# Patient Record
Sex: MALE | Race: WHITE | NOT HISPANIC OR LATINO | Employment: FULL TIME | URBAN - METROPOLITAN AREA
[De-identification: names, ages, dates, MRNs, and addresses within clinical notes are randomized per-mention and may not be internally consistent; named-entity substitution may affect disease eponyms.]

---

## 2024-08-31 ENCOUNTER — HOSPITAL ENCOUNTER (EMERGENCY)
Facility: CLINIC | Age: 24
Discharge: HOME | End: 2024-09-01
Attending: EMERGENCY MEDICINE | Admitting: EMERGENCY MEDICINE

## 2024-08-31 DIAGNOSIS — R07.9 CHEST PAIN, UNSPECIFIED TYPE: ICD-10-CM

## 2024-08-31 PROCEDURE — 99285 EMERGENCY DEPT VISIT HI MDM: CPT | Mod: 25

## 2024-08-31 PROCEDURE — 93005 ELECTROCARDIOGRAM TRACING: CPT

## 2024-08-31 ASSESSMENT — COLUMBIA-SUICIDE SEVERITY RATING SCALE - C-SSRS
6. HAVE YOU EVER DONE ANYTHING, STARTED TO DO ANYTHING, OR PREPARED TO DO ANYTHING TO END YOUR LIFE?: NO
2. HAVE YOU ACTUALLY HAD ANY THOUGHTS OF KILLING YOURSELF IN THE PAST MONTH?: NO
1. IN THE PAST MONTH, HAVE YOU WISHED YOU WERE DEAD OR WISHED YOU COULD GO TO SLEEP AND NOT WAKE UP?: NO

## 2024-09-01 ENCOUNTER — APPOINTMENT (OUTPATIENT)
Dept: GENERAL RADIOLOGY | Facility: CLINIC | Age: 24
End: 2024-09-01
Attending: EMERGENCY MEDICINE

## 2024-09-01 VITALS
BODY MASS INDEX: 24.43 KG/M2 | HEIGHT: 70 IN | RESPIRATION RATE: 20 BRPM | OXYGEN SATURATION: 99 % | TEMPERATURE: 98.4 F | WEIGHT: 170.64 LBS | SYSTOLIC BLOOD PRESSURE: 132 MMHG | HEART RATE: 68 BPM | DIASTOLIC BLOOD PRESSURE: 78 MMHG

## 2024-09-01 LAB
ALBUMIN SERPL BCG-MCNC: 4.2 G/DL (ref 3.5–5.2)
ALP SERPL-CCNC: 70 U/L (ref 40–150)
ALT SERPL W P-5'-P-CCNC: 25 U/L (ref 0–70)
ANION GAP SERPL CALCULATED.3IONS-SCNC: 12 MMOL/L (ref 7–15)
AST SERPL W P-5'-P-CCNC: 14 U/L (ref 0–45)
BASOPHILS # BLD AUTO: 0 10E3/UL (ref 0–0.2)
BASOPHILS NFR BLD AUTO: 1 %
BILIRUB SERPL-MCNC: 0.2 MG/DL
BUN SERPL-MCNC: 12.9 MG/DL (ref 6–20)
CALCIUM SERPL-MCNC: 9 MG/DL (ref 8.8–10.4)
CHLORIDE SERPL-SCNC: 101 MMOL/L (ref 98–107)
CREAT SERPL-MCNC: 0.89 MG/DL (ref 0.67–1.17)
EGFRCR SERPLBLD CKD-EPI 2021: >90 ML/MIN/1.73M2
EOSINOPHIL # BLD AUTO: 0.2 10E3/UL (ref 0–0.7)
EOSINOPHIL NFR BLD AUTO: 4 %
ERYTHROCYTE [DISTWIDTH] IN BLOOD BY AUTOMATED COUNT: 11.6 % (ref 10–15)
GLUCOSE SERPL-MCNC: 111 MG/DL (ref 70–99)
HCO3 SERPL-SCNC: 23 MMOL/L (ref 22–29)
HCT VFR BLD AUTO: 45.3 % (ref 40–53)
HGB BLD-MCNC: 15 G/DL (ref 13.3–17.7)
IMM GRANULOCYTES # BLD: 0 10E3/UL
IMM GRANULOCYTES NFR BLD: 0 %
LIPASE SERPL-CCNC: 30 U/L (ref 13–60)
LYMPHOCYTES # BLD AUTO: 2.1 10E3/UL (ref 0.8–5.3)
LYMPHOCYTES NFR BLD AUTO: 41 %
MCH RBC QN AUTO: 28.1 PG (ref 26.5–33)
MCHC RBC AUTO-ENTMCNC: 33.1 G/DL (ref 31.5–36.5)
MCV RBC AUTO: 85 FL (ref 78–100)
MONOCYTES # BLD AUTO: 0.6 10E3/UL (ref 0–1.3)
MONOCYTES NFR BLD AUTO: 12 %
NEUTROPHILS # BLD AUTO: 2.1 10E3/UL (ref 1.6–8.3)
NEUTROPHILS NFR BLD AUTO: 42 %
NRBC # BLD AUTO: 0 10E3/UL
NRBC BLD AUTO-RTO: 0 /100
PLATELET # BLD AUTO: 185 10E3/UL (ref 150–450)
POTASSIUM SERPL-SCNC: 3.8 MMOL/L (ref 3.4–5.3)
PROT SERPL-MCNC: 7 G/DL (ref 6.4–8.3)
RBC # BLD AUTO: 5.34 10E6/UL (ref 4.4–5.9)
SODIUM SERPL-SCNC: 136 MMOL/L (ref 135–145)
TROPONIN T SERPL HS-MCNC: <6 NG/L
WBC # BLD AUTO: 5 10E3/UL (ref 4–11)

## 2024-09-01 PROCEDURE — 85025 COMPLETE CBC W/AUTO DIFF WBC: CPT | Performed by: EMERGENCY MEDICINE

## 2024-09-01 PROCEDURE — 80053 COMPREHEN METABOLIC PANEL: CPT | Performed by: EMERGENCY MEDICINE

## 2024-09-01 PROCEDURE — 36415 COLL VENOUS BLD VENIPUNCTURE: CPT | Performed by: EMERGENCY MEDICINE

## 2024-09-01 PROCEDURE — 250N000013 HC RX MED GY IP 250 OP 250 PS 637: Performed by: EMERGENCY MEDICINE

## 2024-09-01 PROCEDURE — 84484 ASSAY OF TROPONIN QUANT: CPT | Performed by: EMERGENCY MEDICINE

## 2024-09-01 PROCEDURE — 83690 ASSAY OF LIPASE: CPT | Performed by: EMERGENCY MEDICINE

## 2024-09-01 PROCEDURE — 71046 X-RAY EXAM CHEST 2 VIEWS: CPT

## 2024-09-01 RX ORDER — MAGNESIUM HYDROXIDE/ALUMINUM HYDROXICE/SIMETHICONE 120; 1200; 1200 MG/30ML; MG/30ML; MG/30ML
15 SUSPENSION ORAL ONCE
Status: COMPLETED | OUTPATIENT
Start: 2024-09-01 | End: 2024-09-01

## 2024-09-01 RX ADMIN — ALUMINUM HYDROXIDE, MAGNESIUM HYDROXIDE, AND SIMETHICONE 15 ML: 1200; 120; 1200 SUSPENSION ORAL at 00:20

## 2024-09-01 NOTE — ED PROVIDER NOTES
"  Emergency Department Note      History of Present Illness     Chief Complaint   Chest Pain      HPI   Gagandeep Otero is a 23 year old male who presents to the emergency department with a complaint of chest pain.  Patient reports that he has had chest pain since this morning.  He has not had any shortness of breath, nausea, vomiting.  He reports that he has had this chest pain prior, but never has seen a doctor or been worked up.  He reports sometimes he gets nosebleeds when he has the chest pain, but no nosebleeds today.  Patient denies any fevers, cough.    Independent Historian   None    Review of External Notes       Past Medical History     Medical History and Problem List   No past medical history on file.    Medications   No current outpatient medications on file.      Surgical History   No past surgical history on file.    Physical Exam     Patient Vitals for the past 24 hrs:   BP Temp Temp src Pulse Resp SpO2 Height Weight   09/01/24 0059 132/78 -- -- 68 20 99 % -- --   08/31/24 2336 (!) 149/87 98.4  F (36.9  C) Temporal 70 16 99 % 1.778 m (5' 10\") 77.4 kg (170 lb 10.2 oz)     Physical Exam  General: Well-nourished, resting comfortably when I enter the room  Eyes: Pupils equal, conjunctivae pink no scleral icterus or conjunctival injection  ENT:  Moist mucus membranes  Respiratory:  Lungs clear to auscultation bilaterally, no crackles/rubs/wheezes.  Good air movement  CV: Normal rate and rhythm, no murmurs  GI:  Abdomen soft and non-distended.  No tenderness, guarding or rebound  Skin: Warm, dry.  No rashes or petechiae  Musculoskeletal: No peripheral edema or calf tenderness  Neuro: Alert and oriented to person/place/time  Psychiatric: Normal affect      Diagnostics     Lab Results   Labs Ordered and Resulted from Time of ED Arrival to Time of ED Departure   COMPREHENSIVE METABOLIC PANEL - Abnormal       Result Value    Sodium 136      Potassium 3.8      Carbon Dioxide (CO2) 23      Anion Gap 12      Urea " Nitrogen 12.9      Creatinine 0.89      GFR Estimate >90      Calcium 9.0      Chloride 101      Glucose 111 (*)     Alkaline Phosphatase 70      AST 14      ALT 25      Protein Total 7.0      Albumin 4.2      Bilirubin Total 0.2     LIPASE - Normal    Lipase 30     TROPONIN T, HIGH SENSITIVITY - Normal    Troponin T, High Sensitivity <6     CBC WITH PLATELETS AND DIFFERENTIAL    WBC Count 5.0      RBC Count 5.34      Hemoglobin 15.0      Hematocrit 45.3      MCV 85      MCH 28.1      MCHC 33.1      RDW 11.6      Platelet Count 185      % Neutrophils 42      % Lymphocytes 41      % Monocytes 12      % Eosinophils 4      % Basophils 1      % Immature Granulocytes 0      NRBCs per 100 WBC 0      Absolute Neutrophils 2.1      Absolute Lymphocytes 2.1      Absolute Monocytes 0.6      Absolute Eosinophils 0.2      Absolute Basophils 0.0      Absolute Immature Granulocytes 0.0      Absolute NRBCs 0.0         Imaging   Chest XR,  PA & LAT   Final Result   IMPRESSION: PA and lateral views of the chest were obtained. Cardiomediastinal silhouette is within normal limits. No suspicious focal pulmonary opacities. No significant pleural effusion or pneumothorax.               EKG     ECG results from 08/31/24   EKG 12 lead     Value    Systolic Blood Pressure     Diastolic Blood Pressure     Ventricular Rate 73    Atrial Rate 73    OH Interval 138    QRS Duration 90        QTc 398    P Axis 74    R AXIS 65    T Axis 35    Interpretation ECG      Sinus rhythm  Normal ECG  No previous ECGs available           Independent Interpretation   Chest x-ray does not show any sign of consolidation, pleural effusion, or pneumothorax.    ED Course      Medications Administered   Medications   alum & mag hydroxide-simethicone (MAALOX) suspension 15 mL (15 mLs Oral $Given 9/1/24 0020)       Procedures   Procedures     Discussion of Management   None    ED Course        Additional Documentation  None    Medical Decision Making / Diagnosis      CMS Diagnoses: None    MIPS       None    MDM   Gagandeep Otero is a 23 year old male presents emergency department with a complaint of chest pain.  Patient reports this has been going on intermittently for several months.  Today his pain started this morning and has not gone away.  No associated symptoms.  On exam patient is well-appearing.  Vital signs are all within acceptable limits.  Lung sounds are clear bilaterally.  Patient is PERC negative, and I have low suspicion for PE.  X-ray does not show any sign of pneumonia, pleural effusion, pneumothorax.  Troponin is negative.  No GLORY.  No leukocytosis.  EKG does not show any signs of ischemia.  I have low suspicion for ACS.  Lipase does not show any sign of pancreatitis.  Patient is given a GI cocktail, and has some improvement.  He will follow-up with his primary care physician.  Patient is discharged home.    Disposition   The patient was discharged.     Diagnosis     ICD-10-CM    1. Chest pain, unspecified type  R07.9 Primary Care Referral           Discharge Medications   There are no discharge medications for this patient.        MD Chapincito Dawson Elizabeth, MD  09/01/24 0256

## 2024-09-01 NOTE — ED TRIAGE NOTES
Intermit midsternal chest pain, pt reports he gets nose bleeds every time he gets CP. Has never been worked up for it.

## 2024-09-02 LAB
ATRIAL RATE - MUSE: 73 BPM
DIASTOLIC BLOOD PRESSURE - MUSE: NORMAL MMHG
INTERPRETATION ECG - MUSE: NORMAL
P AXIS - MUSE: 74 DEGREES
PR INTERVAL - MUSE: 138 MS
QRS DURATION - MUSE: 90 MS
QT - MUSE: 362 MS
QTC - MUSE: 398 MS
R AXIS - MUSE: 65 DEGREES
SYSTOLIC BLOOD PRESSURE - MUSE: NORMAL MMHG
T AXIS - MUSE: 35 DEGREES
VENTRICULAR RATE- MUSE: 73 BPM

## 2024-10-06 ENCOUNTER — HOSPITAL ENCOUNTER (EMERGENCY)
Facility: CLINIC | Age: 24
Discharge: HOME | End: 2024-10-06
Attending: PHYSICIAN ASSISTANT | Admitting: PHYSICIAN ASSISTANT

## 2024-10-06 ENCOUNTER — NURSE TRIAGE (OUTPATIENT)
Dept: NURSING | Facility: CLINIC | Age: 24
End: 2024-10-06

## 2024-10-06 VITALS
WEIGHT: 171.96 LBS | HEART RATE: 77 BPM | BODY MASS INDEX: 24.62 KG/M2 | OXYGEN SATURATION: 100 % | DIASTOLIC BLOOD PRESSURE: 81 MMHG | RESPIRATION RATE: 20 BRPM | TEMPERATURE: 98.1 F | SYSTOLIC BLOOD PRESSURE: 127 MMHG | HEIGHT: 70 IN

## 2024-10-06 DIAGNOSIS — S61.211A LACERATION OF LEFT INDEX FINGER WITHOUT FOREIGN BODY WITHOUT DAMAGE TO NAIL, INITIAL ENCOUNTER: ICD-10-CM

## 2024-10-06 PROCEDURE — 99282 EMERGENCY DEPT VISIT SF MDM: CPT

## 2024-10-06 RX ORDER — LIDOCAINE HYDROCHLORIDE 10 MG/ML
INJECTION, SOLUTION EPIDURAL; INFILTRATION; INTRACAUDAL; PERINEURAL
Status: DISCONTINUED
Start: 2024-10-06 | End: 2024-10-06 | Stop reason: HOSPADM

## 2024-10-06 ASSESSMENT — ACTIVITIES OF DAILY LIVING (ADL)
ADLS_ACUITY_SCORE: 35
ADLS_ACUITY_SCORE: 33

## 2024-10-06 NOTE — DISCHARGE INSTRUCTIONS
Clean wound daily with water and gentle soap.  Evaluate wound daily for pus drainage, spreading redness, streaking redness up the arm. Seek out reevaluation should these develop.  Keep wound covered while it heals.  Sutures need to be removed in 10-14 days.  Use Tylenol and ibuprofen for pain.    Discharge Instructions  Laceration (Cut)    You were seen today for a laceration (cut).  Your provider examined your laceration for any problems such a buried foreign body (like glass, a splinter, or gravel), or injury to blood vessels, tendons, and nerves.  Your provider may have also rinsed and/or scrubbed your laceration to help prevent an infection. It may not be possible to find all problems with your laceration on the first visit; occasionally foreign bodies or a tendon injury can go undetected.    Your laceration may have been closed in one of several ways:  No closure: many wounds will heal just fine without closure.  Stitches: regular stitches that require removal.  Staples: skin staples are often used in the scalp/head.  Wound adhesive (glue): skin glue can be used for certain lacerations and doesn t require removal.  Wound strips (aka Butterfly bandages or steri-strips): these are bandages that help to close a wound.  Absorbable stitches:  dissolving  stitches that go away on their own and usually don t require removal.    A small percentage of wounds will develop an infection regardless of how well the wound is cared for. Antibiotics are generally not indicated to prevent an infection so are only given for a small number of high-risk wounds. Some lacerations are too high risk to close, and are left open to heal because closure can increase the likelihood that an infection will develop.    Remember that all lacerations, no matter how expertly repaired, will cause scarring. We consider many factors, techniques, and materials, in our efforts to provide the best possible cosmetic outcome.    Generally, every  Emergency Department visit should have a follow-up clinic visit with either a primary or a specialty clinic/provider. Please follow-up as instructed by your emergency provider today.     Return to the Emergency Department right away if:  You have more redness, swelling, pain, drainage (pus), a bad smell, or red streaking from your laceration as these symptoms could indicate an infection.  You have a fever of 100.4 F or more.  You have bleeding that you cannot stop at home. If your cut starts to bleed, hold pressure on the bleeding area with a clean cloth or put pressure over the bandage.  If the bleeding does not stop after using constant pressure for 30 minutes, you should return to the Emergency Department for further treatment.  An area past the laceration is cool, pale, or blue compared with the other side, or has a slower return of color when squeezed.  Your dressing seems too tight or starts to get uncomfortable or painful. For children, signs of a problem might be irritability or restlessness.  You have loss of normal function or use of an area, such as being unable to straighten or bend a finger normally.  You have a numb area past the laceration.    Return to the Emergency Department or see your regular provider if:  The laceration starts to come open.   You have something coming out of the cut or a feeling that there is something in the laceration.  Your wound will not heal, or keeps breaking open. There can always be glass, wood, dirt or other things in any wound.  They will not always show up, even on x-rays.  If a wound does not heal, this may be why, and it is important to follow-up with your regular provider.    Home Care:  Take your dressing off in 12-24 hours, or as instructed by your provider, to check your laceration. Remove the dressing sooner if it seems too tight or painful, or if it is getting numb, tingly, or pale past the dressing.  Gently wash your laceration 1-2 times daily with clean  water and mild soap. It is okay to shower or run clean water over the laceration, but do not let the laceration soak in water (no swimming).  If your laceration was closed with wound adhesive or strips: pat it dry and leave it open to the air. For all other repairs: after you wash your laceration, or at least 2 times a day, apply antibiotic ointment (such as Neosporin  or Bacitracin ) to the laceration, then cover it with a Band-Aid  or gauze.  Keep the laceration clean. Wear gloves or other protective clothing if you are around dirt.    Follow-up for removal:  If your wound was closed with staples or regular stitches, they need to be removed according to the instructions and timeline specified by your provider today.  If your wound was closed with absorbable ( dissolving ) sutures, they should fall out, dissolve, or not be visible in about one week. If they are still visible, then they should be removed according to the instructions and timeline specified by your provider today.    Scars:  To help minimize scarring:  Wear sunscreen over the healed laceration when out in the sun.  Massage the area regularly once healed.  You may apply Vitamin E to the healed wound.  Wait. Scars improve in appearance over months and years.    If you were given a prescription for medicine here today, be sure to read all of the information (including the package insert) that comes with your prescription.  This will include important information about the medicine, its side effects, and any warnings that you need to know about.  The pharmacist who fills the prescription can provide more information and answer questions you may have about the medicine.  If you have questions or concerns that the pharmacist cannot address, please call or return to the Emergency Department.       Remember that you can always come back to the Emergency Department if you are not able to see your regular provider in the amount of time listed above, if you get  any new symptoms, or if there is anything that worries you.

## 2024-10-06 NOTE — ED TRIAGE NOTES
Pt cutting strawberries this am. Cut left hand finger- bleeding controlled. Up to date on tetanus       Triage Assessment (Adult)       Row Name 10/06/24 6249          Triage Assessment    Airway WDL WDL        Respiratory WDL    Respiratory WDL WDL        Skin Circulation/Temperature WDL    Skin Circulation/Temperature WDL X  finger lac        Peripheral/Neurovascular WDL    Peripheral Neurovascular WDL WDL

## 2024-10-06 NOTE — ED PROVIDER NOTES
"  Emergency Department Note      History of Present Illness     Chief Complaint   Finger Laceration      HPI   Gagandeep Otero is a 23 year old male who presents to the ED for evaluation of a finger laceration. Patient reports that around 0930 he was cutting strawberries when he sustained a laceration to his left pointer finger. He is right handed. He has had trouble with stopping bleeding, prompting presentation to the ED. No numbness or tingling. He is unsure of tetanus status.    Independent Historian   None    Review of External Notes   None    Past Medical History     Medical History and Problem List   No past medical history on file.    Medications   No current outpatient medications on file.      Surgical History   No past surgical history on file.    Physical Exam     Patient Vitals for the past 24 hrs:   BP Temp Pulse Resp SpO2 Height Weight   10/06/24 1651 127/81 98.1  F (36.7  C) 77 20 100 % 1.778 m (5' 10\") 78 kg (171 lb 15.3 oz)     Physical Exam  HEENT: mmm  Eyes: PERRL B/L  Neck: Supple  CV: Peripheral pulses intact and regular  Resp: Speaking in full sentences without any respiratory distress  Ext:  Left Hand  0.75cm laceration to lateral aspect of his distal phalanx of his pointer finger.  Full ROM of digits.  No bony TTP.  Sensation and perfusion intact throughout hand  Remainder of the skeletal survey is unremarkable  Skin: warm dry well perfused. See above.  Neuro: Alert  Nursing notes and vital signs reviewed.      Diagnostics     Lab Results   Labs Ordered and Resulted from Time of ED Arrival to Time of ED Departure - No data to display    Imaging   No orders to display     Independent Interpretation   None    ED Course      Medications Administered   Medications   Tdap (tetanus-diphtheria-acell pertussis) (ADACEL) injection 0.5 mL (has no administration in time range)   lidocaine (PF) (XYLOCAINE) 1 % injection (has no administration in time range)       Procedures   [facility] " [facility]    -Laceration Repair    Date/Time: 10/6/2024 6:03 PM    Performed by: Martín Goff PA-C  Authorized by: Martín Goff PA-C    Risks, benefits and alternatives discussed.      ANESTHESIA (see MAR for exact dosages):     Anesthesia method:  Nerve block    Block needle gauge:  27 G    Block anesthetic:  Lidocaine 1% w/o epi    Block technique:  Transthecal    Block injection procedure:  Anatomic landmarks identified, anatomic landmarks palpated, introduced needle, negative aspiration for blood and incremental injection    Block outcome:  Incomplete block    LACERATION DETAILS     Location:  Finger    Finger location:  L index finger    Length (cm):  0.8    REPAIR TYPE:     Repair type:  Simple    EXPLORATION:     Contaminated: no      TREATMENT:     Amount of cleaning:  Standard    Irrigation solution:  Tap water    Irrigation method:  Tap    Visualized foreign bodies/material removed: no      SKIN REPAIR     Repair method:  Sutures    Suture size:  4-0    Suture material:  Nylon    Suture technique:  Simple interrupted    Number of sutures:  3    APPROXIMATION     Approximation:  Close    POST-PROCEDURE DETAILS     Dressing:  Antibiotic ointment and adhesive bandage      PROCEDURE    Patient Tolerance:  Patient tolerated the procedure well with no immediate complications       Discussion of Management   None    ED Course        Additional Documentation  None    Medical Decision Making / Diagnosis     CMS Diagnoses: None    MIPS       None    MDM   Gagandeep Otero is a 23 year old male who presents for evaluation of a laceration to the left pointer finger.  The wound was carefully evaluated and explored.  The laceration was closed as noted above.  There is no evidence of muscular, tendon, or bony damage with this laceration.  No signs of foreign body.  Possible complications (infection, scarring) were reviewed with the patient.  Follow up with primary care as noted in the discharge section. Patient declines  tetanus update. Discussed reasons to return. All questions answered. Patient discharged to home in stable condition.    Disposition   The patient was discharged.     Diagnosis     ICD-10-CM    1. Laceration of left index finger without foreign body without damage to nail, initial encounter  S61.211A Primary Care Referral           Discharge Medications   New Prescriptions    No medications on file       This record was created at least in part using electronic voice recognition software, so please excuse any typographical errors.         Martín Goff PA-C  10/06/24 1800

## 2024-10-06 NOTE — TELEPHONE ENCOUNTER
Patient cut his finger on left index finger about 9:30 am.  Cut his finger with a knife. Finger is still bleeding.  Patient states that he will go to ER.    Reason for Disposition   [1] Bleeding AND [2] won't stop after 10 minutes of direct pressure (using correct technique)    Additional Information   Negative: [1] Major bleeding (e.g., actively dripping or spurting) AND [2] can't be stopped   Negative: Amputation   Negative: Shock suspected (e.g., cold/pale/clammy skin, too weak to stand, low BP, rapid pulse)   Negative: [1] Knife wound (or other possibly deep cut) AND [2] to chest, abdomen, back, neck, or head   Negative: [1] Self-injury (e.g., cutting, self-harm) AND [2] suicidal or out-of-control   Negative: Sounds like a life-threatening emergency to the triager   Negative: [1] Animal bite AND [2] broken skin   Negative: [1] Human bite AND [2] broken skin   Negative: Puncture wound    Protocols used: Cuts and [id number]

## 2024-12-28 ENCOUNTER — NURSE TRIAGE (OUTPATIENT)
Dept: URGENT CARE | Facility: URGENT CARE | Age: 24
End: 2024-12-28

## 2024-12-28 NOTE — TELEPHONE ENCOUNTER
Patient was seen in  yesterday and has been following recommendation of ibuprofen at home with no improvement. Patient is drinking lots of fluids, warm and cold. Throat pain 8/10. Patient gargling with warm salt water. Sore throat started Hernando Clark. The lymph node on the right side of his neck goes from his ear to almost his real's apple. No fever or redness.   Protocol recommends see HCP within 24 hours  Patient care advice given. Patient will return to  tomorrow.  Dari Holden RN   12/28/24 2:25 PM  [facility] Nurse Advisor                Reason for Disposition   [1] Sore throat is the only symptom AND [2] sore throat present < 48 hours   [1] Single large node AND [2] size > 1 inch (2.5 cm) AND [3] no fever   [1] Tender node in the neck AND [2] also has a sore throat AND [3] minimal/no runny nose or cough    Additional Information   Negative: SEVERE difficulty breathing (e.g., struggling for each breath, speaks in single words, stridor)   Negative: Sounds like a life-threatening emergency to the triager   Negative: [1] Diagnosed strep throat AND [2] taking antibiotic AND [3] symptoms continue   Negative: Throat culture results, call about   Negative: Productive cough is main symptom   Negative: Non-productive cough is main symptom   Negative: Hoarseness is main symptom   Negative: Runny nose is main symptom   Negative: Uvula swelling is main symptom   Negative: [1] Drooling or spitting out saliva (because can't swallow) AND [2] normal breathing   Negative: Unable to open mouth completely   Negative: [1] Difficulty breathing AND [2] not severe   Negative: Fever > 104 F (40 C)   Negative: [1] Refuses to drink anything AND [2] for > 12 hours   Negative: [1] Drinking very little AND [2] dehydration suspected (e.g., no urine > 12 hours, very dry mouth, very lightheaded)   Negative: Patient sounds very sick or weak to the triager   Negative: SEVERE (e.g., excruciating) throat pain   Negative: [1] Pus on  "tonsils (back of throat) AND [2]  fever AND [3] swollen neck lymph nodes (\"glands\")   Negative: [1] Rash AND [2] widespread (especially chest and abdomen)   Negative: Earache also present   Negative: Fever present > 3 days (72 hours)   Negative: Diabetes mellitus or weak immune system (e.g., HIV positive, cancer chemo, splenectomy, organ transplant, chronic steroids)   Negative: History of rheumatic fever   Negative: [1] Positive throat culture or rapid strep test (according to lab, PCP, caller, etc.) AND [2] NO  standing order to call in prescription for antibiotic   Negative: [1] Exposure to family member (or spouse or boyfriend/girlfriend) with test-proven strep AND [2] within last 10 days   Negative: [1] Adult is leaving on a trip AND [2] requests an antibiotic NOW   Negative: [1] Sore throat is the only symptom AND [2] present > 48 hours   Negative: [1] Sore throat with cough/cold symptoms AND [2] present > 5 days   Negative: SEVERE difficulty breathing (e.g., struggling for each breath, speaks in single words)   Negative: Known hernia is main concern (i.e., soft lump or swelling in the groin that goes away when you push on it)   Negative: Swelling of scrotum is main symptom   Negative: Swelling of face is main symptom   Negative: [1] Swollen node is in the neck AND [2] < 1 inch (2.5 cm) in size AND [3] sore throat is main symptom   Negative: [1] Node is in the neck AND [2] causes difficulty breathing   Negative: [1] Node is in the neck AND [2] can't swallow fluids   Negative: Fever > 103 F (39.4 C)   Negative: [1] Lump or swelling in groin AND [2] pulsating (like heartbeat)   Negative: Patient sounds very sick or weak to the triager   Negative: [1] Single large node AND [2] size > 1 inch (2.5 cm) AND [3] fever   Negative: [1] Overlying skin is red AND [2] fever    Protocols used: Sore Throat-A-, Lymph Nodes - [id number]    "

## 2024-12-28 NOTE — TELEPHONE ENCOUNTER
General Call      Reason for Call:  call back    What are your questions or concerns:  Patient was seen yesterday in urgent care and ibuprofen or cough spray is not helping and is wondering if he can get prescribed a medication to help with the symptoms     Date of last appointment with provider: 12/27/24    Okay to leave a detailed message?: Yes at Cell number on file:    Telephone Information:   Mobile [phone]

## 2024-12-29 ENCOUNTER — HOSPITAL ENCOUNTER (EMERGENCY)
Facility: CLINIC | Age: 24
Discharge: HOME | End: 2024-12-29
Attending: EMERGENCY MEDICINE | Admitting: EMERGENCY MEDICINE

## 2024-12-29 VITALS
SYSTOLIC BLOOD PRESSURE: 136 MMHG | OXYGEN SATURATION: 98 % | RESPIRATION RATE: 18 BRPM | BODY MASS INDEX: 25.2 KG/M2 | HEIGHT: 70 IN | HEART RATE: 102 BPM | DIASTOLIC BLOOD PRESSURE: 92 MMHG | TEMPERATURE: 98.4 F | WEIGHT: 176 LBS

## 2024-12-29 DIAGNOSIS — B27.90 INFECTIOUS MONONUCLEOSIS WITHOUT COMPLICATION, INFECTIOUS MONONUCLEOSIS DUE TO UNSPECIFIED ORGANISM: ICD-10-CM

## 2024-12-29 DIAGNOSIS — D69.6 THROMBOCYTOPENIA (H): ICD-10-CM

## 2024-12-29 LAB
BASOPHILS # BLD AUTO: 0.1 10E3/UL (ref 0–0.2)
BASOPHILS NFR BLD AUTO: 1 %
EOSINOPHIL # BLD AUTO: 0 10E3/UL (ref 0–0.7)
EOSINOPHIL NFR BLD AUTO: 0 %
ERYTHROCYTE [DISTWIDTH] IN BLOOD BY AUTOMATED COUNT: 12.8 % (ref 10–15)
HCT VFR BLD AUTO: 44.4 % (ref 40–53)
HGB BLD-MCNC: 14.9 G/DL (ref 13.3–17.7)
HOLD SPECIMEN: NORMAL
IMM GRANULOCYTES # BLD: 0 10E3/UL
IMM GRANULOCYTES NFR BLD: 0 %
LYMPHOCYTES # BLD AUTO: 7.3 10E3/UL (ref 0.8–5.3)
LYMPHOCYTES NFR BLD AUTO: 66 %
MCH RBC QN AUTO: 27.5 PG (ref 26.5–33)
MCHC RBC AUTO-ENTMCNC: 33.6 G/DL (ref 31.5–36.5)
MCV RBC AUTO: 82 FL (ref 78–100)
MONOCYTES # BLD AUTO: 0.6 10E3/UL (ref 0–1.3)
MONOCYTES NFR BLD AUTO: 6 %
MONOCYTES NFR BLD AUTO: POSITIVE %
NEUTROPHILS # BLD AUTO: 3 10E3/UL (ref 1.6–8.3)
NEUTROPHILS NFR BLD AUTO: 27 %
NRBC # BLD AUTO: 0 10E3/UL
NRBC BLD AUTO-RTO: 0 /100
PLAT MORPH BLD: NORMAL
PLATELET # BLD AUTO: 123 10E3/UL (ref 150–450)
RBC # BLD AUTO: 5.41 10E6/UL (ref 4.4–5.9)
RBC MORPH BLD: NORMAL
S PYO DNA THROAT QL NAA+PROBE: NOT DETECTED
WBC # BLD AUTO: 11 10E3/UL (ref 4–11)

## 2024-12-29 PROCEDURE — 99284 EMERGENCY DEPT VISIT MOD MDM: CPT | Mod: 25

## 2024-12-29 PROCEDURE — 96361 HYDRATE IV INFUSION ADD-ON: CPT

## 2024-12-29 PROCEDURE — 85004 AUTOMATED DIFF WBC COUNT: CPT | Performed by: EMERGENCY MEDICINE

## 2024-12-29 PROCEDURE — 96375 TX/PRO/DX INJ NEW DRUG ADDON: CPT

## 2024-12-29 PROCEDURE — 250N000011 HC RX IP 250 OP 636: Performed by: EMERGENCY MEDICINE

## 2024-12-29 PROCEDURE — 86308 HETEROPHILE ANTIBODY SCREEN: CPT | Performed by: EMERGENCY MEDICINE

## 2024-12-29 PROCEDURE — 258N000003 HC RX IP 258 OP 636: Performed by: EMERGENCY MEDICINE

## 2024-12-29 PROCEDURE — 36415 COLL VENOUS BLD VENIPUNCTURE: CPT | Performed by: EMERGENCY MEDICINE

## 2024-12-29 PROCEDURE — 87651 STREP A DNA AMP PROBE: CPT | Performed by: EMERGENCY MEDICINE

## 2024-12-29 PROCEDURE — 96374 THER/PROPH/DIAG INJ IV PUSH: CPT

## 2024-12-29 RX ORDER — DEXAMETHASONE SODIUM PHOSPHATE 10 MG/ML
10 INJECTION, SOLUTION INTRAMUSCULAR; INTRAVENOUS ONCE
Status: COMPLETED | OUTPATIENT
Start: 2024-12-29 | End: 2024-12-29

## 2024-12-29 RX ORDER — KETOROLAC TROMETHAMINE 15 MG/ML
15 INJECTION, SOLUTION INTRAMUSCULAR; INTRAVENOUS ONCE
Status: COMPLETED | OUTPATIENT
Start: 2024-12-29 | End: 2024-12-29

## 2024-12-29 RX ORDER — DEXAMETHASONE 4 MG/1
TABLET ORAL
Qty: 5 TABLET | Refills: 0 | Status: SHIPPED | OUTPATIENT
Start: 2024-12-29

## 2024-12-29 RX ADMIN — KETOROLAC TROMETHAMINE 15 MG: 15 INJECTION, SOLUTION INTRAMUSCULAR; INTRAVENOUS at 07:00

## 2024-12-29 RX ADMIN — SODIUM CHLORIDE 1000 ML: 9 INJECTION, SOLUTION INTRAVENOUS at 07:01

## 2024-12-29 RX ADMIN — DEXAMETHASONE SODIUM PHOSPHATE 10 MG: 10 INJECTION, SOLUTION INTRAMUSCULAR; INTRAVENOUS at 07:01

## 2024-12-29 ASSESSMENT — ACTIVITIES OF DAILY LIVING (ADL)
ADLS_ACUITY_SCORE: 41

## 2024-12-29 NOTE — ED TRIAGE NOTES
Pt presents with c/o sore throat since 12/25/24. Was seen at  and told strep test was negative and to use ibuprofen for pain.    Pain continue to worsen and pt has very swollen tonsils with difficulty swallowing due to pain. Tonsils appear swollen with exudate seen   Triage Assessment (Adult)       Row Name 12/29/24 0605          Triage Assessment    Airway WDL WDL        Respiratory WDL    Respiratory WDL WDL        Skin Circulation/Temperature WDL    Skin Circulation/Temperature WDL WDL        Cardiac WDL    Cardiac WDL X  rapid        Peripheral/Neurovascular WDL    Peripheral Neurovascular WDL WDL        Cognitive/Neuro/Behavioral WDL    Cognitive/Neuro/Behavioral WDL WDL

## 2024-12-29 NOTE — LETTER
December 29, 2024      To Whom It May Concern:      Gagandeep Otero was seen in our Emergency Department today, 12/29/24 and diagnosed with mononucleosis. I expect his condition to improve over the next 7 days.    Sincerely,        Kristy Olivera MD  Electronically signed

## 2024-12-29 NOTE — ED PROVIDER NOTES
"  History     Chief Complaint:  Pharyngitis       HPI   Gagandeep Otero is a 24 year old male who presents with pharyngitis. The patient reports having a sore throat since December 24th; he had going to the ER and was tested negative for strep. His pain was since worsened, feeling like he had \"glass\" in his throat. He has taken ibuprofen with not relief. He notes a low grade fever of 100F measured at home. He has been able to keep food down, stating increase of liquid intake. Patient denies abdominal pain, coughing or emesis. He does not have pertinent medica problems. He denies being exposed to mono or having mono in the past.     Review of External notes      Medications:    The patient is not currently taking any regular medications.        Past Medical History:    The patient denies any significant past medical history.       Physical Exam   Patient Vitals for the past 24 hrs:   BP Temp Temp src Pulse Resp SpO2 Height Weight   12/29/24 0603 136/82 98.4  F (36.9  C) Temporal 115 20 100 % 1.778 m (5' 10\") 79.8 kg (176 lb)        Physical Exam    Gen: alert, answers all questions appropriately.   HEENT: PERRL. Oropharyngeal erythema, Bilateral exudate. No trismus, uvula midline, oropharynx symmetric. Bilateral adenopathy. Voice slightly hoarse, is able to swallow.   Ears-  Right ear: TM normal, no tenderness over the external ear or mastoid  Left ear: TM normal, no tenderness over the external ear or mastoid  Neck: full AROM  CV: RRR  Pulm: breath sounds equal, lungs clear  Skin: facial skin normal  Neuro: CN 5 and 7 normal     Emergency Department Course   ECG:  ECG results from 08/31/24   EKG 12 lead     Value    Systolic Blood Pressure     Diastolic Blood Pressure     Ventricular Rate 73    Atrial Rate 73    OR Interval 138    QRS Duration 90        QTc 398    P Axis 74    R AXIS 65    T Axis 35    Interpretation ECG      Sinus rhythm  Normal ECG  No previous ECGs available  Confirmed by - EMERGENCY ROOM, " PHYSICIAN ([id number]),  GAYATHRI OBRIEN ([id number]) on 9/2/2024 9:07:29 AM         Imaging:  No orders to display          Laboratory:  Labs Ordered and Resulted from Time of ED Arrival to Time of ED Departure - No data to display       ED Course as of 12/29/24 1506   Sun Dec 29, 2024   0624 I obtained the history and examined the patient as noted above.     0925 I rechecked the patient and explained findings; patient is getting discharged.          Interventions:  Medications - No data to display     Impression & Plan    Independent Interpretation:      Medical Decision Making:  Patient presents for evaluation of sore throat.  Exam shows tonsillitis anterior cervical adenopathy.  Monospot positive.  History and exam do correlate with mononucleosis infection.  Mild thrombocytopenia noted.  Likely due to mono.  Patient feeling improved after steroids and IV fluids here.  Will give 2 additional doses of steroids for home dexamethasone.  Encouraged to see primary care within the next 1 week for reassessment.  Discussed return precautions including increasing pain, difficulty swallowing, shortness of breath or other worsening.  Discussed risk for splenic rupture with trauma and advised to avoid any contact activity.  Discharge home    Disposition:  Discharge    Diagnosis:    ICD-10-CM    1. Infectious mononucleosis without complication, infectious mononucleosis due to unspecified organism  B27.90       2. Thrombocytopenia (H)  D69.6            Discharge Medications:  Discharge Medication List as of 12/29/2024  9:37 AM        START taking these medications    Details   dexAMETHasone (DECADRON) 4 MG tablet Take 10 mg (2.5 tabs) Tuesday with breakfast and Thursday with breakfast, Disp-5 tablet, R-0, E-Prescribe              Kristy Olivera  December 29, 2024        Kristy Olivera MD  12/29/24 1502

## 2024-12-30 ENCOUNTER — NURSE TRIAGE (OUTPATIENT)
Dept: INTERNAL MEDICINE | Facility: CLINIC | Age: 24
End: 2024-12-30

## 2024-12-30 NOTE — TELEPHONE ENCOUNTER
Patient reports mono symptoms are not improving and having a difficult time sleeping overnight due to symptoms. Patient denies any SOB or difficulty breathing right now but overnight did feel tonsils were more swollen.     Discussed with 2 providers today to possible use [facility] spot as no appointments in clinic and patient does not have PCP. Both providers recommended take prescription for steroids today and have to allow body to fight infection.     Discussed red flag symptoms with patient, and scheduled follow-up for tomorrow morning. Also discussed importance of being scheduled for primary care/annual- patient will schedule tomorrow.     Summer RN 8:41 AM December 30, 2024   [facility]

## 2024-12-31 ENCOUNTER — APPOINTMENT (OUTPATIENT)
Dept: CT IMAGING | Facility: CLINIC | Age: 24
End: 2024-12-31
Attending: PHYSICIAN ASSISTANT

## 2024-12-31 ENCOUNTER — HOSPITAL ENCOUNTER (EMERGENCY)
Facility: CLINIC | Age: 24
Discharge: HOME | End: 2024-12-31
Attending: PHYSICIAN ASSISTANT

## 2024-12-31 VITALS
RESPIRATION RATE: 18 BRPM | SYSTOLIC BLOOD PRESSURE: 147 MMHG | OXYGEN SATURATION: 100 % | HEART RATE: 105 BPM | TEMPERATURE: 98.8 F | DIASTOLIC BLOOD PRESSURE: 84 MMHG

## 2024-12-31 DIAGNOSIS — B27.90 INFECTIOUS MONONUCLEOSIS WITHOUT COMPLICATION, INFECTIOUS MONONUCLEOSIS DUE TO UNSPECIFIED ORGANISM: ICD-10-CM

## 2024-12-31 DIAGNOSIS — J03.90 EXUDATIVE TONSILLITIS: ICD-10-CM

## 2024-12-31 LAB
ANION GAP SERPL CALCULATED.3IONS-SCNC: 13 MMOL/L (ref 7–15)
BUN SERPL-MCNC: 11.8 MG/DL (ref 6–20)
CALCIUM SERPL-MCNC: 8.9 MG/DL (ref 8.8–10.4)
CHLORIDE SERPL-SCNC: 100 MMOL/L (ref 98–107)
CREAT SERPL-MCNC: 0.95 MG/DL (ref 0.67–1.17)
EGFRCR SERPLBLD CKD-EPI 2021: >90 ML/MIN/1.73M2
GLUCOSE SERPL-MCNC: 102 MG/DL (ref 70–99)
HCO3 SERPL-SCNC: 23 MMOL/L (ref 22–29)
POTASSIUM SERPL-SCNC: 4.1 MMOL/L (ref 3.4–5.3)
SODIUM SERPL-SCNC: 136 MMOL/L (ref 135–145)

## 2024-12-31 PROCEDURE — 250N000009 HC RX 250: Performed by: PHYSICIAN ASSISTANT

## 2024-12-31 PROCEDURE — 96374 THER/PROPH/DIAG INJ IV PUSH: CPT | Mod: 59 | Performed by: PHYSICIAN ASSISTANT

## 2024-12-31 PROCEDURE — 250N000011 HC RX IP 250 OP 636: Performed by: PHYSICIAN ASSISTANT

## 2024-12-31 PROCEDURE — 85007 BL SMEAR W/DIFF WBC COUNT: CPT | Performed by: PHYSICIAN ASSISTANT

## 2024-12-31 PROCEDURE — 96361 HYDRATE IV INFUSION ADD-ON: CPT | Performed by: PHYSICIAN ASSISTANT

## 2024-12-31 PROCEDURE — 70491 CT SOFT TISSUE NECK W/DYE: CPT

## 2024-12-31 PROCEDURE — 36415 COLL VENOUS BLD VENIPUNCTURE: CPT | Performed by: PHYSICIAN ASSISTANT

## 2024-12-31 PROCEDURE — 85027 COMPLETE CBC AUTOMATED: CPT | Performed by: PHYSICIAN ASSISTANT

## 2024-12-31 PROCEDURE — 258N000003 HC RX IP 258 OP 636: Performed by: PHYSICIAN ASSISTANT

## 2024-12-31 PROCEDURE — 80048 BASIC METABOLIC PNL TOTAL CA: CPT | Performed by: PHYSICIAN ASSISTANT

## 2024-12-31 PROCEDURE — 96375 TX/PRO/DX INJ NEW DRUG ADDON: CPT | Mod: 59 | Performed by: PHYSICIAN ASSISTANT

## 2024-12-31 PROCEDURE — 250N000013 HC RX MED GY IP 250 OP 250 PS 637: Performed by: PHYSICIAN ASSISTANT

## 2024-12-31 PROCEDURE — 82310 ASSAY OF CALCIUM: CPT | Performed by: PHYSICIAN ASSISTANT

## 2024-12-31 PROCEDURE — 99285 EMERGENCY DEPT VISIT HI MDM: CPT | Mod: 25 | Performed by: PHYSICIAN ASSISTANT

## 2024-12-31 RX ORDER — IBUPROFEN 600 MG/1
600 TABLET, FILM COATED ORAL EVERY 6 HOURS PRN
Qty: 12 TABLET | Refills: 0 | Status: SHIPPED | OUTPATIENT
Start: 2024-12-31

## 2024-12-31 RX ORDER — PREDNISONE 20 MG/1
40 TABLET ORAL DAILY
Qty: 6 TABLET | Refills: 0 | Status: SHIPPED | OUTPATIENT
Start: 2024-12-31 | End: 2025-01-03

## 2024-12-31 RX ORDER — IOPAMIDOL 755 MG/ML
500 INJECTION, SOLUTION INTRAVASCULAR ONCE
Status: COMPLETED | OUTPATIENT
Start: 2024-12-31 | End: 2024-12-31

## 2024-12-31 RX ORDER — DEXAMETHASONE SODIUM PHOSPHATE 10 MG/ML
10 INJECTION, SOLUTION INTRAMUSCULAR; INTRAVENOUS ONCE
Status: COMPLETED | OUTPATIENT
Start: 2024-12-31 | End: 2024-12-31

## 2024-12-31 RX ORDER — ACETAMINOPHEN 500 MG
1000 TABLET ORAL ONCE
Status: COMPLETED | OUTPATIENT
Start: 2024-12-31 | End: 2024-12-31

## 2024-12-31 RX ORDER — KETOROLAC TROMETHAMINE 15 MG/ML
15 INJECTION, SOLUTION INTRAMUSCULAR; INTRAVENOUS ONCE
Status: COMPLETED | OUTPATIENT
Start: 2024-12-31 | End: 2024-12-31

## 2024-12-31 RX ADMIN — DEXAMETHASONE SODIUM PHOSPHATE 10 MG: 10 INJECTION, SOLUTION INTRAMUSCULAR; INTRAVENOUS at 12:20

## 2024-12-31 RX ADMIN — IOPAMIDOL 90 ML: 755 INJECTION, SOLUTION INTRAVENOUS at 13:52

## 2024-12-31 RX ADMIN — SODIUM CHLORIDE 65 ML: 9 INJECTION, SOLUTION INTRAVENOUS at 13:52

## 2024-12-31 RX ADMIN — ACETAMINOPHEN 1000 MG: 500 TABLET, FILM COATED ORAL at 16:16

## 2024-12-31 RX ADMIN — KETOROLAC TROMETHAMINE 15 MG: 15 INJECTION, SOLUTION INTRAMUSCULAR; INTRAVENOUS at 12:20

## 2024-12-31 RX ADMIN — SODIUM CHLORIDE 1000 ML: 9 INJECTION, SOLUTION INTRAVENOUS at 12:20

## 2024-12-31 ASSESSMENT — ACTIVITIES OF DAILY LIVING (ADL)
ADLS_ACUITY_SCORE: 41

## 2024-12-31 ASSESSMENT — COLUMBIA-SUICIDE SEVERITY RATING SCALE - C-SSRS
6. HAVE YOU EVER DONE ANYTHING, STARTED TO DO ANYTHING, OR PREPARED TO DO ANYTHING TO END YOUR LIFE?: NO
1. IN THE PAST MONTH, HAVE YOU WISHED YOU WERE DEAD OR WISHED YOU COULD GO TO SLEEP AND NOT WAKE UP?: NO
2. HAVE YOU ACTUALLY HAD ANY THOUGHTS OF KILLING YOURSELF IN THE PAST MONTH?: NO

## 2024-12-31 NOTE — Clinical Note
Gagandeep Otero was seen and treated in our emergency department on 12/31/2024.  He may return to work on 01/01/2025.  Gagandeep may work half days until 01/03/2025     If you have any questions or concerns, please don't hesitate to call.      Parris Bernabe PA-C

## 2024-12-31 NOTE — DISCHARGE INSTRUCTIONS
Your scan today shows significant tonsillitis likely related to mononucleosis. No evidence of abscess. A prescription is provided for steroids and ibuprofen. Please take as prescribed. Follow-up with primary care or ENT at contact information provided. Return to ED with any new or worsening symptoms.

## 2024-12-31 NOTE — ED PROVIDER NOTES
Emergency Department Note      History of Present Illness     Chief Complaint   Pharyngitis    HPI   Gagandeep Otero is a 24 year old male presenting with pharyngitis. The patient states that he's been having cough, sore throat and fatigue since 12/24/24. He was diagnosed with mononucleosis on ED visit on 12/29/2024 and started on oral steroids. Strep test from previous urgent care visit was negative. Reports swelling of tongue and difficulty swallowing since this morning. He is able to swallow but notes significant pain. He also noted some orange discharge in his mouth.  He denies fever, chest pain or abdominal pain. The patient has been taking tylenol and ibuprofen at home, with no relief. No recent abdominal trauma. No history of health issues or tonsillar abscess. 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     Independent Historian   None    Review of External Notes   Reviewed ED visit from 12/29/24 when patient diagnosed with mononucleosis. Given steroid and toradol.     Past Medical History     Medical History and Problem List   The patient denies any significant past medical history.     Medications   Decadron      Physical Exam     Patient Vitals for the past 24 hrs:   BP Temp Temp src Pulse Resp SpO2   12/31/24 1625 (!) 147/84 -- -- 105 18 100 %   12/31/24 1014 125/74 98.8  F (37.1  C) Temporal 120 24 100 %     Physical Exam  Constitutional: Alert, attentive, GCS 15  HENT:    Nose: Nose normal.    Mouth/Throat: Mucous membranes are moist.  Posterior oropharynx reveals significantly enlarged bilateral palatine tonsils with exudate. Uvula midline. No distinct peritonsillar swelling or fluctuance. Muffled voice noted. Airway patent. Normal secretions. No drooling or tripod stance.  Eyes: EOM are normal. Pupils equal and reactive.  Neck: Normal range of motion. No rigidity.  CV: regular rate and rhythm; no murmurs, rubs or gallups  Chest: Effort normal and breath sounds normal.   MSK: Normal range of  motion.   Neurological: Alert, attentive  Skin: Skin is warm and dry. No rash.     Diagnostics     Lab Results   Labs Ordered and Resulted from Time of ED Arrival to Time of ED Departure   BASIC METABOLIC PANEL - Abnormal       Result Value    Sodium 136      Potassium 4.1      Chloride 100      Carbon Dioxide (CO2) 23      Anion Gap 13      Urea Nitrogen 11.8      Creatinine 0.95      GFR Estimate >90      Calcium 8.9      Glucose 102 (*)    CBC WITH PLATELETS AND DIFFERENTIAL - Abnormal    WBC Count 13.1 (*)     RBC Count 5.49      Hemoglobin 14.9      Hematocrit 45.1      MCV 82      MCH 27.1      MCHC 33.0      RDW 13.0      Platelet Count 147 (*)    MANUAL DIFFERENTIAL - Abnormal    % Neutrophils 52      % Lymphocytes 42      % Monocytes 6      % Eosinophils 0      % Basophils 0      Absolute Neutrophils 6.8      Absolute Lymphocytes 5.5 (*)     Absolute Monocytes 0.8      Absolute Eosinophils 0.0      Absolute Basophils 0.0      RBC Morphology Confirmed RBC Indices      Platelet Assessment        Value: Automated Count Confirmed. Platelet morphology is normal.    Reactive Lymphocytes Present (*)        Imaging   Soft tissue neck CT w contrast   Final Result   IMPRESSION:   1. Symmetric enlargement and heterogeneous enhancement involving the   palatine tonsils and to a lesser degree the pharyngeal tonsils, likely   reflecting tonsillitis/pharyngitis. No discrete drainable   intratonsillar/peritonsillar fluid collection or abscess identified.   2. Bulky relatively diffuse bilateral cervical lymphadenopathy,   presumably reactive to an infectious/inflammatory process. Neoplastic   process is thought to be less likely. Recommend close clinical   follow-up.   3. Narrowing/effacement of the bilateral mid to upper internal jugular   veins related to external mass effect that is in part due to bulky   cervical lymphadenopathy.      KIMBERLY STACK MD            SYSTEM ID:  [id number]          EKG   None    Independent  Interpretation   None    ED Course      Medications Administered   Medications   ketorolac (TORADOL) injection 15 mg (15 mg Intravenous $Given 12/31/24 1220)   dexAMETHasone PF (DECADRON) injection 10 mg (10 mg Intravenous $Given 12/31/24 1220)   sodium chloride 0.9% BOLUS 1,000 mL (0 mLs Intravenous Stopped 12/31/24 1614)   iopamidol (ISOVUE-370) solution 500 mL (90 mLs Intravenous $Given 12/31/24 1352)   CT scan flush use (65 mLs As instructed $Given 12/31/24 1352)   acetaminophen (TYLENOL) tablet 1,000 mg (1,000 mg Oral $Given 12/31/24 1616)       Procedures   Procedures     Discussion of Management   None    ED Course   ED Course as of 12/31/24 1641   Tue Dec 31, 2024   1201 I obtained the history and examined the patient as noted above.          Additional Documentation  None    Medical Decision Making / Diagnosis     CMS Diagnoses: None    MIPS       None    MDM   Gagandeep Otero is a 24 year old male who presents for evaluation of sore throat and difficulty swallowing in the setting of mononucleosis infection. He was diagnosed 4 days ago during an ED visit and started on oral steroids.  He is afebrile, hypertensive at 147/84, nonhypoxic.  Physical exam reveals well-appearing patient with significant exudative tonsillitis.  Airway remains patent and he speaks without difficulty.  He is also handling secretions and there is no evidence of tripoding.  Labs today reveal mild leukocytosis at 13 and improving thrombocytopenia from last ED visit. Soft tissue neck CT reveals palatine and likely pharyngeal tonsillitis which is consistent with exam. No evidence of peritonsillar abscess or epiglottis involvement. There is also some compression of the jugular veins bilaterally from cervical lymphadenopathy.  Results reviewed with patient at bedside. No evidence of facial swelling or congestion/plethora on exam. He had symptomatic relief with repeat IV steroids and Toradol.  Symptoms are consistent with bilateral viral  tonsillitis associated with mononucleosis infection. No evidence of meningitis.  There is no evidence of complications associated above.  He is tolerating his secretions and may be managed as an outpatient.  I will extend his oral steroids and anti-inflammatory medications as below.  Recommend close follow-up with primary care/ENT for recheck and contact information provided for local ENT group.  Return precautions to the emergency department discussed including difficulty breathing, drooling, or vomiting.  Patient is comfortable with this plan and is discharged home.  Work note is provided.    Disposition   The patient was discharged.     Diagnosis     ICD-10-CM    1. Infectious mononucleosis without complication, infectious mononucleosis due to unspecified organism  B27.90       2. Exudative tonsillitis  J03.90            Discharge Medications   Discharge Medication List as of 12/31/2024  4:16 PM        START taking these medications    Details   ibuprofen (ADVIL/MOTRIN) 600 MG tablet Take 1 tablet (600 mg) by mouth every 6 hours as needed for moderate pain., Disp-12 tablet, R-0, Local Print      predniSONE (DELTASONE) 20 MG tablet Take 2 tablets (40 mg) by mouth daily for 3 days., Disp-6 tablet, R-0, Local Print           Scribe Disclosure:  IAlecia, am serving as a scribe at 12:22 PM on 12/31/2024 to document services personally performed by Parris Bernabe PA-C based on my observations and the provider's statements to me.     Scribe Disclosure:  Bing THOMAS, am serving as the scribe  for Alecia Augustine, the scribe trainee, at 12:26 PM on 12/31/2024 to document services personally performed by Parris Bernabe PA-C based on our observations and the provider's statements to us.         Parris Bernabe PA-C  12/31/24 3251

## 2024-12-31 NOTE — ED TRIAGE NOTES
"Arrives ambulatory from the community. States he has KNOWN mono, states \"Everything is swollen in my throat and tonsils.\".     Reportedly unable to tolerated PO intake or over secretions, NOT spitting or gagging in triage, in triage APPEARS to tolerated own secretions.     Also reports ongoing fevers, motrin around 0800 last. Tylenol around 0200.         "

## 2025-01-01 ENCOUNTER — NURSE TRIAGE (OUTPATIENT)
Dept: NURSING | Facility: CLINIC | Age: 25
End: 2025-01-01

## 2025-01-02 LAB
BASOPHILS # BLD MANUAL: 0 10E3/UL (ref 0–0.2)
BASOPHILS NFR BLD MANUAL: 0 %
EOSINOPHIL # BLD MANUAL: 0 10E3/UL (ref 0–0.7)
EOSINOPHIL NFR BLD MANUAL: 0 %
ERYTHROCYTE [DISTWIDTH] IN BLOOD BY AUTOMATED COUNT: 13 % (ref 10–15)
HCT VFR BLD AUTO: 45.1 % (ref 40–53)
HGB BLD-MCNC: 14.9 G/DL (ref 13.3–17.7)
LYMPHOCYTES # BLD MANUAL: 5.5 10E3/UL (ref 0.8–5.3)
LYMPHOCYTES NFR BLD MANUAL: 42 %
MCH RBC QN AUTO: 27.1 PG (ref 26.5–33)
MCHC RBC AUTO-ENTMCNC: 33 G/DL (ref 31.5–36.5)
MCV RBC AUTO: 82 FL (ref 78–100)
MONOCYTES # BLD MANUAL: 0.8 10E3/UL (ref 0–1.3)
MONOCYTES NFR BLD MANUAL: 6 %
NEUTROPHILS # BLD MANUAL: 6.8 10E3/UL (ref 1.6–8.3)
NEUTROPHILS NFR BLD MANUAL: 52 %
PATH REV: ABNORMAL
PLAT MORPH BLD: ABNORMAL
PLATELET # BLD AUTO: 147 10E3/UL (ref 150–450)
RBC # BLD AUTO: 5.49 10E6/UL (ref 4.4–5.9)
RBC MORPH BLD: ABNORMAL
VARIANT LYMPHS BLD QL SMEAR: PRESENT
WBC # BLD AUTO: 13.1 10E3/UL (ref 4–11)

## 2025-01-02 NOTE — TELEPHONE ENCOUNTER
"In ER a couple times. [place]. Just woke up vomiting.    Lorraine Camacho RN  [facility]    Reason for Disposition   MILD or MODERATE vomiting (e.g., 1 - 5 times / day)    Additional Information   Negative: Shock suspected (e.g., cold/pale/clammy skin, too weak to stand, low BP, rapid pulse)   Negative: Difficult to awaken or acting confused (e.g., disoriented, slurred speech)   Negative: Sounds like a life-threatening emergency to the triager   Negative: [1] Vomiting AND [2] contains red blood or black (\"coffee ground\") material  (Exception: Few red streaks in vomit that only happened once.)   Negative: Severe pain in one eye   Negative: Recent head injury (within last 3 days)   Negative: Recent abdominal injury (within last 3 days)   Negative: [1] Insulin-dependent diabetes (Type I) AND [2] glucose > 400 mg/dl (22 mmol/l)   Negative: [1] Vomiting AND [2] hernia is more painful or swollen than usual   Negative: [1] SEVERE vomiting (e.g., 6 or more times/day) AND [2] present > 8 hours (Exception: Patient sounds well, is drinking liquids, does not sound dehydrated, and vomiting has lasted less than 24 hours.)   Negative: [1] MODERATE vomiting (e.g., 3 - 5 times/day) AND [2] age > 60 years   Negative: Severe headache (e.g., excruciating)  (Exception: Similar to previous migraines.)     A little at 4   Negative: High-risk adult (e.g., diabetes mellitus, brain tumor, V-P shunt, hernia)   Negative: [1] Drinking very little AND [2] dehydration suspected (e.g., no urine > 12 hours, very dry mouth, very lightheaded)   Negative: Patient sounds very sick or weak to the triager   Negative: [1] Vomiting AND [2] abdomen looks much more swollen than usual   Negative: [1] Vomiting AND [2] contains bile (green color)   Negative: [1] Constant abdominal pain AND [2] present > 2 hours     On the left   Negative: [1] Fever > 103 F (39.4 C) AND [2] not able to get the fever down using Fever Care Advice   Negative: [1] Fever > " 101 F (38.3 C) AND [2] age > 60 years   Negative: [1] Fever > 100.0 F (37.8 C) AND [2] bedridden (e.g., CVA, chronic illness, recovering from surgery)   Negative: [1] Fever > 100.0 F (37.8 C) AND [2] weak immune system (e.g., HIV positive, cancer chemo, splenectomy, organ transplant, chronic steroids)   Negative: Taking any of the following medications: digoxin (Lanoxin), lithium, theophylline, phenytoin (Dilantin)   Negative: [1] MILD or MODERATE vomiting AND [2] present > 48 hours (2 days) (Exception: Mild vomiting with associated diarrhea.)   Negative: Fever present > 3 days (72 hours)   Negative: Vomiting a prescription medication   Negative: [1] MILD vomiting with diarrhea AND [2] present > 5 days   Negative: Substance use (drug use) or unhealthy alcohol use, known or suspected   Negative: Vomiting is a chronic symptom (recurrent or ongoing AND present > 4 weeks)   Negative: [1] SEVERE vomiting (e.g., 6 or more times/day, vomits everything) BUT [2] hydrated    Protocols used: Vomiting-A-AH